# Patient Record
Sex: FEMALE | Race: WHITE | NOT HISPANIC OR LATINO | Employment: FULL TIME | URBAN - METROPOLITAN AREA
[De-identification: names, ages, dates, MRNs, and addresses within clinical notes are randomized per-mention and may not be internally consistent; named-entity substitution may affect disease eponyms.]

---

## 2021-08-20 ENCOUNTER — HOSPITAL ENCOUNTER (EMERGENCY)
Facility: CLINIC | Age: 21
Discharge: HOME | End: 2021-08-20
Attending: NURSE PRACTITIONER | Admitting: NURSE PRACTITIONER
Payer: OTHER MISCELLANEOUS

## 2021-08-20 VITALS
DIASTOLIC BLOOD PRESSURE: 77 MMHG | OXYGEN SATURATION: 99 % | TEMPERATURE: 98.8 F | HEART RATE: 100 BPM | SYSTOLIC BLOOD PRESSURE: 113 MMHG | RESPIRATION RATE: 22 BRPM

## 2021-08-20 DIAGNOSIS — S61.213A LACERATION OF LEFT MIDDLE FINGER WITHOUT FOREIGN BODY WITHOUT DAMAGE TO NAIL, INITIAL ENCOUNTER: ICD-10-CM

## 2021-08-20 DIAGNOSIS — S61.215A LACERATION OF LEFT RING FINGER WITHOUT FOREIGN BODY WITHOUT DAMAGE TO NAIL, INITIAL ENCOUNTER: ICD-10-CM

## 2021-08-20 PROCEDURE — 12001 RPR S/N/AX/GEN/TRNK 2.5CM/<: CPT

## 2021-08-20 PROCEDURE — 90471 IMMUNIZATION ADMIN: CPT | Performed by: NURSE PRACTITIONER

## 2021-08-20 PROCEDURE — 90715 TDAP VACCINE 7 YRS/> IM: CPT | Performed by: NURSE PRACTITIONER

## 2021-08-20 PROCEDURE — 99283 EMERGENCY DEPT VISIT LOW MDM: CPT | Mod: 25

## 2021-08-20 PROCEDURE — 250N000011 HC RX IP 250 OP 636: Performed by: NURSE PRACTITIONER

## 2021-08-20 RX ADMIN — CLOSTRIDIUM TETANI TOXOID ANTIGEN (FORMALDEHYDE INACTIVATED), CORYNEBACTERIUM DIPHTHERIAE TOXOID ANTIGEN (FORMALDEHYDE INACTIVATED), BORDETELLA PERTUSSIS TOXOID ANTIGEN (GLUTARALDEHYDE INACTIVATED), BORDETELLA PERTUSSIS FILAMENTOUS HEMAGGLUTININ ANTIGEN (FORMALDEHYDE INACTIVATED), BORDETELLA PERTUSSIS PERTACTIN ANTIGEN, AND BORDETELLA PERTUSSIS FIMBRIAE 2/3 ANTIGEN 0.5 ML: 5; 2; 2.5; 5; 3; 5 INJECTION, SUSPENSION INTRAMUSCULAR at 19:04

## 2021-08-20 NOTE — ED PROVIDER NOTES
History     Chief Complaint:  Finger laceration       HPI   Tamia Jacobsen is a 20 year old female who presents with lacerations to the palmar aspect her left middle and ring fingers. She was using a  at work when she accidentally cut her fingers while trying to open a box. She can move the fingers and notes normal sensation. Her last tetanus was 2010. She is right hand dominant.     Allergies:  No Known Allergies     Medications:    No current outpatient medications on file.      Past Medical History:    No past medical history on file.    There are no problems to display for this patient.       Past Surgical History:    No past surgical history on file.     Family History:    family history is not on file.    Social History:       PCP: No primary care provider on file.     Review of Systems   Musculoskeletal: Negative for arthralgias and joint swelling.   Skin: Positive for wound.   Neurological: Negative for weakness and numbness.   All other systems reviewed and are negative.        Physical Exam     Patient Vitals for the past 24 hrs:   BP Temp Temp src Pulse Resp SpO2   08/20/21 1636 113/77 98.8  F (37.1  C) Temporal 100 22 99 %        Physical Exam     Physical Exam   Constitutional:  Sitting in the bed with bandages to her left middle and ring fingers.   Head: Atraumatic.  Head moves freely with normal range of motion.   Eyes: Conjunctivae pink. EOMs intact.   Neck: Normal range of motion.   Cardiovascular: Intact distal pulses: radial pulse 2+ on the right, 2+ on the left.   Pulmonary/Chest: No respiratory distress.   Musculoskeletal: Distal capillary refill and sensation intact. Tendon function intact.She can flex and extend the left middle and ring fingers at the DIP, PIP and MCP joints.   Neurological: Oriented to person, place, and time. No focal deficits. GCS 15  Skin: There is a 2 cm laceration noted to the palmar middle finger and a 1 cm laceration noted to the palmar ring finger .   Surrounding skin is warm with no erythema or heat to touch.        Emergency Department Course     Procedures:     Laceration Repair      LACERATION:  A simple clean 2 cm laceration to the middle finger and a 1cm laceration to the ring finger    LOCATION:  Left middle and ring fingers.    FUNCTION:  Distally sensation, circulation, motor and tendon function are intact.    ANESTHESIA:  Digital block using 1% lidocaine total of 1 mLs per finger.    PREPARATION:  Irrigation and Scrubbing with Normal Saline and Shur Clens.    DEBRIDEMENT:  no debridement and wound explored, no foreign body found.    CLOSURE:  Wound was closed with One Layer.  Skin closed with 5 x 5.0 Ethylon using interrupted sutures to the middle finger, 2 x 5.0 Ethylon using interrupted sutures to the ring finger. .      Interventions:    Medications   Tdap (tetanus-diphtheria-acell pertussis) (ADACEL) injection 0.5 mL (0.5 mLs Intramuscular Given 8/20/21 1904)        Emergency Department Course:  Past medical records, nursing notes, and vitals reviewed.  I performed an exam of the patient and obtained history, as documented above.    I rechecked the patient. Findings and plan explained to the Patient and her mother. Patient was discharged home.    Impression & Plan       Medical Decision Making:  Tamia Jacobsen is a 20 year old female with lacerations to the her left middle and ring fingers at the palmar aspect. Tetanus updated here today. WOund explored and thankfully no foreign body and no tendon or bone involvement. Sutures placed as noted above. We discussed wound care, reasons to return here and need for suture removal in 7-10 days. Alumafoam splints placed to fingers. Patient and her mother are amenable to plan.         Diagnosis:    ICD-10-CM    1. Laceration of left middle finger without foreign body without damage to nail, initial encounter  S61.213A    2. Laceration of left ring finger without foreign body without damage to nail, initial  encounter  S61.215A         Discharge Medications:     Medication List      There are no discharge medications for this visit.          8/20/2021   SARAH Jacobsen CNP, Laura Kristen Guilday, APRN CNP  08/21/21 1331

## 2021-08-20 NOTE — ED TRIAGE NOTES
Patient reports cutting her third and fourth finger with a  at 1510 today. Patient controlled bleeding PTA with dressing. Unknown last tetanus. CMS intact.

## 2021-08-21 ASSESSMENT — ENCOUNTER SYMPTOMS
WEAKNESS: 0
ARTHRALGIAS: 0
JOINT SWELLING: 0
NUMBNESS: 0
WOUND: 1

## 2021-08-21 NOTE — DISCHARGE INSTRUCTIONS
Sutures out in 7-10 days    Return for signs or symptoms of infection such as: fever, increased redness, heat to touch, increased pain or pus-like drainage.

## 2021-08-28 ENCOUNTER — OFFICE VISIT (OUTPATIENT)
Dept: URGENT CARE | Facility: URGENT CARE | Age: 21
End: 2021-08-28
Payer: OTHER MISCELLANEOUS

## 2021-08-28 VITALS
HEART RATE: 69 BPM | DIASTOLIC BLOOD PRESSURE: 76 MMHG | OXYGEN SATURATION: 98 % | SYSTOLIC BLOOD PRESSURE: 111 MMHG | TEMPERATURE: 99.6 F

## 2021-08-28 DIAGNOSIS — Z48.02 ENCOUNTER FOR REMOVAL OF SUTURES: Primary | ICD-10-CM

## 2021-08-28 RX ORDER — OMEPRAZOLE 10 MG/1
20 CAPSULE, DELAYED RELEASE ORAL DAILY
COMMUNITY

## 2021-08-28 RX ORDER — FLUOXETINE 10 MG/1
10 CAPSULE ORAL DAILY
COMMUNITY

## 2021-08-28 NOTE — PATIENT INSTRUCTIONS
"  Patient Education     Stitches or Staple Removal  You were seen today for removal of your stitches or staples. Your wound is healing as expected. The wound has healed well enough that the stitches or staples can be removed. The wound will continue to heal for the next few months.   At this time there is no sign of infection.   Home care    If you have pain, take pain medicine as advised by your healthcare provider.     Keep your wound clean and protected by covering it with a bandage for the next week or so.     Wash your hands with soap and warm water before and after caring for your wound. This helps prevent infection.    Clean the wound gently with soap and clean, running water daily or as directed by your healthcare provider. Don't use iodine, alcohol, or other cleansers on the wound.  Gently pat it dry. Put on a new bandage, if needed. Don't reuse bandages.    If the area gets wet, gently pat it dry with a clean cloth. Replace the wet bandage with a dry one.    Check the wound daily for signs of infection. (These are listed under \"When to seek medical advice\" below.)    You may shower and bathe as usual. Swimming may be allowed, but check with your healthcare provider first.    Keep the wound out of prolonged direct sunlight. The new skin is very sensitive and can easily sunburn causing worse scarring.    Ask your provider about using over-the-counter scar removing creams if your wound is highly visible.    Follow-up care  Follow up with your healthcare provider as advised.  When to seek medical advice   Call your healthcare provider if any of the following occur:    Wound reopens or bleeds    Signs of an infection, such as:  ? Increasing redness or swelling around the wound  ? Increased warmth from the wound  ? Worsening pain  ? Red streaking lines away from the wound  ? Fluid draining from the wound    Fever of 100.4 F (38 C) or higher, or as directed by your healthcare provider  Kathy last reviewed this " educational content on 4/1/2018 2000-2021 [employer]. All rights reserved. This information is not intended as a substitute for professional medical care. Always follow your healthcare professional's instructions.

## 2021-08-28 NOTE — PROGRESS NOTES
Tamia Jacobsen presents to the clinic for removal of sutures and sutures,staples, steri strips. The patient has had sutures in place for 8 days. There has been no patient reported signs or symptoms of infection or drainage. 5  sutures and sutures,staples, staple, steri strips are seen and located on the ring finger. Tetanus status is up to date. All sutures and sutures,staples, steri strips were easily removed today. Routine wound care discussed by the RN or provider. The patient will follow up as needed.